# Patient Record
Sex: FEMALE | Race: WHITE | Employment: FULL TIME | URBAN - METROPOLITAN AREA
[De-identification: names, ages, dates, MRNs, and addresses within clinical notes are randomized per-mention and may not be internally consistent; named-entity substitution may affect disease eponyms.]

---

## 2018-07-27 ENCOUNTER — HOSPITAL ENCOUNTER (OUTPATIENT)
Age: 38
Discharge: HOME | End: 2018-07-29
Payer: COMMERCIAL

## 2018-07-27 ENCOUNTER — OFFICE VISIT (OUTPATIENT)
Dept: PRIMARY CARE CLINIC | Age: 38
End: 2018-07-27
Payer: COMMERCIAL

## 2018-07-27 VITALS
TEMPERATURE: 97.6 F | OXYGEN SATURATION: 98 % | HEART RATE: 76 BPM | HEIGHT: 62 IN | DIASTOLIC BLOOD PRESSURE: 84 MMHG | SYSTOLIC BLOOD PRESSURE: 122 MMHG | WEIGHT: 200 LBS | BODY MASS INDEX: 36.8 KG/M2

## 2018-07-27 DIAGNOSIS — R53.82 CHRONIC FATIGUE: Primary | ICD-10-CM

## 2018-07-27 DIAGNOSIS — S46.911A STRAIN OF RIGHT SHOULDER, INITIAL ENCOUNTER: ICD-10-CM

## 2018-07-27 DIAGNOSIS — R53.82 CHRONIC FATIGUE: ICD-10-CM

## 2018-07-27 LAB
ANION GAP SERPL CALCULATED.3IONS-SCNC: 12 MMOL/L (ref 7–16)
BUN BLDV-MCNC: 15 MG/DL (ref 6–20)
CALCIUM SERPL-MCNC: 9.1 MG/DL (ref 8.6–10.2)
CHLORIDE BLD-SCNC: 105 MMOL/L (ref 98–107)
CO2: 25 MMOL/L (ref 22–29)
CREAT SERPL-MCNC: 1 MG/DL (ref 0.5–1)
GFR AFRICAN AMERICAN: >60
GFR NON-AFRICAN AMERICAN: >60 ML/MIN/1.73
GLUCOSE BLD-MCNC: 83 MG/DL (ref 74–109)
HCT VFR BLD CALC: 42.7 % (ref 34–48)
HEMOGLOBIN: 13.8 G/DL (ref 11.5–15.5)
MCH RBC QN AUTO: 28.5 PG (ref 26–35)
MCHC RBC AUTO-ENTMCNC: 32.3 % (ref 32–34.5)
MCV RBC AUTO: 88 FL (ref 80–99.9)
PDW BLD-RTO: 14.1 FL (ref 11.5–15)
PLATELET # BLD: 236 E9/L (ref 130–450)
PMV BLD AUTO: 11.5 FL (ref 7–12)
POTASSIUM SERPL-SCNC: 4.3 MMOL/L (ref 3.5–5)
RBC # BLD: 4.85 E12/L (ref 3.5–5.5)
SODIUM BLD-SCNC: 142 MMOL/L (ref 132–146)
TSH SERPL DL<=0.05 MIU/L-ACNC: 1.79 UIU/ML (ref 0.27–4.2)
WBC # BLD: 7.7 E9/L (ref 4.5–11.5)

## 2018-07-27 PROCEDURE — 99214 OFFICE O/P EST MOD 30 MIN: CPT | Performed by: FAMILY MEDICINE

## 2018-07-27 PROCEDURE — 80048 BASIC METABOLIC PNL TOTAL CA: CPT

## 2018-07-27 PROCEDURE — 84443 ASSAY THYROID STIM HORMONE: CPT

## 2018-07-27 PROCEDURE — 85027 COMPLETE CBC AUTOMATED: CPT

## 2018-07-27 ASSESSMENT — ENCOUNTER SYMPTOMS
SORE THROAT: 0
CONSTIPATION: 0
SWOLLEN GLANDS: 0
VISUAL CHANGE: 0
DIARRHEA: 0
VOMITING: 0
COUGH: 0
SHORTNESS OF BREATH: 0
ABDOMINAL PAIN: 0
NAUSEA: 0

## 2018-07-27 NOTE — PATIENT INSTRUCTIONS
Patient Education        Rotator Cuff: Exercises  Your Care Instructions  Here are some examples of typical rehabilitation exercises for your condition. Start each exercise slowly. Ease off the exercise if you start to have pain. Your doctor or physical therapist will tell you when you can start these exercises and which ones will work best for you. How to do the exercises  Pendulum swing    If you have pain in your back, do not do this exercise. 1. Hold on to a table or the back of a chair with your good arm. Then bend forward a little and let your sore arm hang straight down. This exercise does not use the arm muscles. Rather, use your legs and your hips to create movement that makes your arm swing freely. 2. Use the movement from your hips and legs to guide the slightly swinging arm back and forth like a pendulum (or elephant trunk). Then guide it in circles that start small (about the size of a dinner plate). Make the circles a bit larger each day, as your pain allows. 3. Do this exercise for 5 minutes, 5 to 7 times each day. 4. As you have less pain, try bending over a little farther to do this exercise. This will increase the amount of movement at your shoulder. Posterior stretching exercise    1. Hold the elbow of your injured arm with your other hand. 2. Use your hand to pull your injured arm gently up and across your body. You will feel a gentle stretch across the back of your injured shoulder. 3. Hold for at least 15 to 30 seconds. Then slowly lower your arm. 4. Repeat 2 to 4 times. Up-the-back stretch    Your doctor or physical therapist may want you to wait to do this stretch until you have regained most of your range of motion and strength. You can do this stretch in different ways. Hold any of these stretches for at least 15 to 30 seconds. Repeat them 2 to 4 times. 1. Put your hand in your back pocket. Let it rest there to stretch your shoulder.   2. With your other hand, hold your injured arm (palm outward) behind your back by the wrist. Pull your arm up gently to stretch your shoulder. 3. Next, put a towel over your other shoulder. Put the hand of your injured arm behind your back. Now hold the back end of the towel. With the other hand, hold the front end of the towel in front of your body. Pull gently on the front end of the towel. This will bring your hand farther up your back to stretch your shoulder. Overhead stretch    1. Standing about an arm's length away, grasp onto a solid surface. You could use a countertop, a doorknob, or the back of a sturdy chair. 2. With your knees slightly bent, bend forward with your arms straight. Lower your upper body, and let your shoulders stretch. 3. As your shoulders are able to stretch farther, you may need to take a step or two backward. 4. Hold for at least 15 to 30 seconds. Then stand up and relax. If you had stepped back during your stretch, step forward so you can keep your hands on the solid surface. 5. Repeat 2 to 4 times. Shoulder flexion (lying down)    To make a wand for this exercise, use a piece of PVC pipe or a broom handle with the broom removed. Make the wand about a foot wider than your shoulders. 1. Lie on your back, holding a wand with both hands. Your palms should face down as you hold the wand. 2. Keeping your elbows straight, slowly raise your arms over your head. Raise them until you feel a stretch in your shoulders, upper back, and chest.  3. Hold for 15 to 30 seconds. 4. Repeat 2 to 4 times. Shoulder rotation (lying down)    To make a wand for this exercise, use a piece of PVC pipe or a broom handle with the broom removed. Make the wand about a foot wider than your shoulders. 1. Lie on your back. Hold a wand with both hands with your elbows bent and palms up. 2. Keep your elbows close to your body, and move the wand across your body toward the sore arm. 3. Hold for 8 to 12 seconds. 4. Repeat 2 to 4 times.   Wall climbing (to the side)    Avoid any movement that is straight to your side, and be careful not to arch your back. Your arm should stay about 30 degrees to the front of your side. 1. Stand with your side to a wall so that your fingers can just touch it at an angle about 30 degrees toward the front of your body. 2. Walk the fingers of your injured arm up the wall as high as pain permits. Try not to shrug your shoulder up toward your ear as you move your arm up. 3. Hold that position for a count of at least 15 to 20.  4. Walk your fingers back down to the starting position. 5. Repeat at least 2 to 4 times. Try to reach higher each time. Wall climbing (to the front)    During this stretching exercise, be careful not to arch your back. 1. Face a wall, and stand so your fingers can just touch it. 2. Keeping your shoulder down, walk the fingers of your injured arm up the wall as high as pain permits. (Don't shrug your shoulder up toward your ear.)  3. Hold your arm in that position for at least 15 to 30 seconds. 4. Slowly walk your fingers back down to where you started. 5. Repeat at least 2 to 4 times. Try to reach higher each time. Shoulder blade squeeze    1. Stand with your arms at your sides, and squeeze your shoulder blades together. Do not raise your shoulders up as you squeeze. 2. Hold 6 seconds. 3. Repeat 8 to 12 times. Scapular exercise: Arm reach    1. Lie flat on your back. This exercise is a very slight motion that starts with your arms raised (elbows straight, arms straight). 2. From this position, reach higher toward the lorena or ceiling. Keep your elbows straight. All motion should be from your shoulder blade only. 3. Relax your arms back to where you started. 4. Repeat 8 to 12 times. Arm raise to the side    During this strengthening exercise, your arm should stay about 30 degrees to the front of your side. 1. Slowly raise your injured arm to the side, with your thumb facing up.  Raise your arm 60

## 2018-07-27 NOTE — PROGRESS NOTES
Prabha Villarreal, a female of 45 y.o. came to the office 7/27/2018. Patient Active Problem List   Diagnosis    Status post induction of labor          Fatigue   This is a new problem. The current episode started more than 1 month ago (Greater than 6 months ago). The problem occurs constantly. The problem has been unchanged (A little improved in the summer months). Associated symptoms include fatigue and headaches. Pertinent negatives include no abdominal pain, chest pain, chills, coughing, fever, nausea, sore throat, swollen glands, visual change or vomiting. Arthralgias: A lot of bad headaches in Feb and March, were daily at the time, but have since resolved. Nothing aggravates the symptoms. She has tried nothing for the symptoms. R. Shoulder: Started working out in May, involved a lot of push-ups and planks and that's when the shoulder started hurting. Hurts more with overuse     GERD: Saw Dr. Iona Troy in January. He did a scope at the time and found severe GERD. Currently on Omeprazole and has a 6 month follow-up scheduled. Allergies   Allergen Reactions    Adhesive Tape Rash       Current Outpatient Prescriptions on File Prior to Visit   Medication Sig Dispense Refill    omeprazole (PRILOSEC) 20 MG delayed release capsule TAKE ONE CAPSULE BY MOUTH ONCE A DAY 30 capsule 1    cetirizine (ZYRTEC) 10 MG tablet Take 10 mg by mouth daily. No current facility-administered medications on file prior to visit. Review of Systems   Constitutional: Positive for fatigue. Negative for chills and fever. HENT: Negative for sore throat. Respiratory: Negative for cough and shortness of breath. Cardiovascular: Negative for chest pain, palpitations and leg swelling. Gastrointestinal: Negative for abdominal pain, constipation, diarrhea, nausea and vomiting. Endocrine: Positive for cold intolerance. Negative for heat intolerance, polyphagia and polyuria.    Genitourinary: Positive for menstrual problem (heavy). Musculoskeletal: Arthralgias: A lot of bad headaches in Feb and March, were daily at the time, but have since resolved. Skin:        + hair loss and brittle nails. Neurological: Positive for headaches. Psychiatric/Behavioral: Positive for sleep disturbance (Restless feeling in her legs at night when she tries to go to sleep, can be painful at times, been happening for 3-4 months). Negative for agitation, decreased concentration and dysphoric mood. The patient is not nervous/anxious. All other review of systems reviewed and are negative    OBJECTIVE:  /84   Pulse 76   Temp 97.6 °F (36.4 °C)   Ht 5' 2\" (1.575 m)   Wt 200 lb (90.7 kg)   SpO2 98%   BMI 36.58 kg/m²      Physical Exam   Constitutional: She is oriented to person, place, and time. She appears well-developed and well-nourished. HENT:   Head: Normocephalic and atraumatic. Right Ear: External ear normal.   Left Ear: External ear normal.   Nose: Nose normal.   Mouth/Throat: No oropharyngeal exudate. Eyes: Conjunctivae and EOM are normal. Pupils are equal, round, and reactive to light. Neck: Normal range of motion. Neck supple. No thyromegaly present. Cardiovascular: Normal rate, regular rhythm and normal heart sounds. Pulmonary/Chest: Effort normal and breath sounds normal. No respiratory distress. She has no wheezes. Abdominal: Soft. Bowel sounds are normal. There is tenderness (mild) in the right upper quadrant and epigastric area. Musculoskeletal:        Right shoulder: She exhibits no tenderness, no bony tenderness, no swelling and no effusion. R. Shoulder pain with abduction above 90 and extension of the shoulder   Lymphadenopathy:     She has no cervical adenopathy. Neurological: She is alert and oriented to person, place, and time. No cranial nerve deficit. Skin: Skin is warm and dry. Psychiatric: She has a normal mood and affect.    Right Shoulder Exam     Tenderness   None    Range of

## 2018-12-03 ENCOUNTER — OFFICE VISIT (OUTPATIENT)
Dept: PRIMARY CARE CLINIC | Age: 38
End: 2018-12-03
Payer: COMMERCIAL

## 2018-12-03 VITALS
BODY MASS INDEX: 39.87 KG/M2 | WEIGHT: 218 LBS | TEMPERATURE: 97.7 F | OXYGEN SATURATION: 98 % | HEART RATE: 82 BPM | DIASTOLIC BLOOD PRESSURE: 82 MMHG | SYSTOLIC BLOOD PRESSURE: 122 MMHG

## 2018-12-03 DIAGNOSIS — J06.9 URI WITH COUGH AND CONGESTION: Primary | ICD-10-CM

## 2018-12-03 PROCEDURE — 99213 OFFICE O/P EST LOW 20 MIN: CPT | Performed by: FAMILY MEDICINE

## 2018-12-03 RX ORDER — PROMETHAZINE HYDROCHLORIDE AND CODEINE PHOSPHATE 6.25; 1 MG/5ML; MG/5ML
5 SYRUP ORAL 4 TIMES DAILY PRN
Qty: 180 ML | Refills: 0 | Status: SHIPPED | OUTPATIENT
Start: 2018-12-03 | End: 2018-12-12

## 2018-12-03 RX ORDER — AMOXICILLIN 500 MG/1
500 CAPSULE ORAL 3 TIMES DAILY
Qty: 30 CAPSULE | Refills: 0 | Status: SHIPPED | OUTPATIENT
Start: 2018-12-03 | End: 2018-12-13

## 2018-12-03 ASSESSMENT — ENCOUNTER SYMPTOMS
WHEEZING: 0
RHINORRHEA: 0
SORE THROAT: 0
COUGH: 1
SHORTNESS OF BREATH: 1

## 2018-12-03 ASSESSMENT — PATIENT HEALTH QUESTIONNAIRE - PHQ9
SUM OF ALL RESPONSES TO PHQ QUESTIONS 1-9: 0
1. LITTLE INTEREST OR PLEASURE IN DOING THINGS: 0
SUM OF ALL RESPONSES TO PHQ9 QUESTIONS 1 & 2: 0
2. FEELING DOWN, DEPRESSED OR HOPELESS: 0
SUM OF ALL RESPONSES TO PHQ QUESTIONS 1-9: 0

## 2018-12-03 NOTE — PATIENT INSTRUCTIONS
idea of what usually happens with your specific illness. · Think about preparing papers that state your wishes. This way there will not be any confusion about what you want. You can change your instructions at any time. Which papers should you prepare? Advance directives are legal papers that tell doctors how you want to be cared for at the end of your life. You do not need a  to write these papers. Ask your doctor or your state health department for information on how to write your advance directives. They may have the forms for each of these types of papers. Make sure your doctor has a copy of these on file, and give a copy to a family member or close friend. · Consider a do-not-resuscitate order (DNR). This order asks that no extra treatments be done if your heart stops or you stop breathing. Extra treatments may include cardiopulmonary resuscitation (CPR), electrical shock to restart your heart, or a machine to breathe for you. If you decide to have a DNR order, ask your doctor to explain and write it. Place the order in your home where everyone can easily see it. · Consider a living will. A living will explains your wishes about life support and other treatments at the end of your life if you become unable to speak for yourself. Living crocker tell doctors to use or not use treatments that would keep you alive. You must have one or two witnesses or a notary present when you sign this form. · Consider a durable power of  for health care. This allows you to name a person to make decisions about your care if you are not able to. Most people ask a close friend or family member. Talk to this person about the kinds of treatments you want and those that you do not want. Make sure this person understands your wishes. These legal papers are simple to change. Tell your doctor what you want to change, and ask him or her to make a note in your medical file.  Give your family updated copies of the

## 2018-12-03 NOTE — PROGRESS NOTES
Ricky Guzman, a female of 45 y.o. came to the office 12/3/2018. Patient Active Problem List   Diagnosis    Status post induction of labor          Cough   This is a new problem. The current episode started in the past 7 days (11/28). The problem has been gradually worsening. The cough is non-productive. Associated symptoms include ear pain, headaches, postnasal drip and shortness of breath. Pertinent negatives include no chills, fever, nasal congestion, rhinorrhea, sore throat or wheezing. Associated symptoms comments: Had cold 4 wks ago. . Treatments tried: mucinex and Claritin daily. The treatment provided no relief. Allergies   Allergen Reactions    Adhesive Tape Rash       Current Outpatient Prescriptions on File Prior to Visit   Medication Sig Dispense Refill    omeprazole (PRILOSEC) 20 MG delayed release capsule TAKE ONE CAPSULE BY MOUTH ONCE A DAY 30 capsule 5    cetirizine (ZYRTEC) 10 MG tablet Take 10 mg by mouth daily. No current facility-administered medications on file prior to visit. Review of Systems   Constitutional: Negative for chills and fever. HENT: Positive for ear pain and postnasal drip. Negative for rhinorrhea and sore throat. Respiratory: Positive for cough and shortness of breath. Negative for wheezing. Neurological: Positive for headaches. All other review of systems reviewed and are negative    OBJECTIVE:  /82   Pulse 82   Temp 97.7 °F (36.5 °C)   Wt 218 lb (98.9 kg)   SpO2 98%   BMI 39.87 kg/m²      Physical Exam   Constitutional: No distress. HENT:   Right Ear: Tympanic membrane normal.   Left Ear: Tympanic membrane normal.   Nose: Mucosal edema (right) and rhinorrhea (mucus strand) present. Right sinus exhibits no maxillary sinus tenderness and no frontal sinus tenderness. Left sinus exhibits no maxillary sinus tenderness and no frontal sinus tenderness.    Mouth/Throat: Uvula is midline, oropharynx is clear and moist and mucous membranes are normal. No oropharyngeal exudate or posterior oropharyngeal erythema. Neck: Neck supple. Cardiovascular: Normal rate and regular rhythm. Pulmonary/Chest: Effort normal and breath sounds normal.   Lymphadenopathy:     She has no cervical adenopathy. ASSESSMENT AND PLAN:    Izaiah Varma was seen today for cough. Diagnoses and all orders for this visit:    URI with cough and congestion  -     amoxicillin (AMOXIL) 500 MG capsule; Take 1 capsule by mouth 3 times daily for 10 days  -     promethazine-codeine (PHENERGAN WITH CODEINE) 6.25-10 MG/5ML syrup; Take 5 mLs by mouth 4 times daily as needed for Cough for up to 9 days. .    - continue Claritin and Mucinex  - push fluids. Controlled Substances Monitoring:     RX Monitoring 12/3/2018   Attestation The Prescription Monitoring Report for this patient was reviewed today. Documentation No signs of potential drug abuse or diversion identified. Return if symptoms worsen or fail to improve.     Alejandro Biggs, DO

## 2019-01-04 ENCOUNTER — HOSPITAL ENCOUNTER (OUTPATIENT)
Age: 39
Discharge: HOME | End: 2019-01-06

## 2019-01-04 LAB
HCT VFR BLD CALC: 41.1 % (ref 34–48)
HEMOGLOBIN: 13.5 G/DL (ref 11.5–15.5)
MCH RBC QN AUTO: 28.1 PG (ref 26–35)
MCHC RBC AUTO-ENTMCNC: 32.8 % (ref 32–34.5)
MCV RBC AUTO: 85.4 FL (ref 80–99.9)
PDW BLD-RTO: 13.7 FL (ref 11.5–15)
PLATELET # BLD: 264 E9/L (ref 130–450)
PMV BLD AUTO: 10.7 FL (ref 7–12)
RBC # BLD: 4.81 E12/L (ref 3.5–5.5)
WBC # BLD: 7 E9/L (ref 4.5–11.5)

## 2019-01-04 PROCEDURE — 88304 TISSUE EXAM BY PATHOLOGIST: CPT

## 2019-01-04 PROCEDURE — 85027 COMPLETE CBC AUTOMATED: CPT

## 2019-12-26 ENCOUNTER — OFFICE VISIT (OUTPATIENT)
Dept: FAMILY MEDICINE CLINIC | Age: 39
End: 2019-12-26
Payer: COMMERCIAL

## 2019-12-26 VITALS
OXYGEN SATURATION: 97 % | BODY MASS INDEX: 42.62 KG/M2 | WEIGHT: 233 LBS | TEMPERATURE: 98.5 F | RESPIRATION RATE: 18 BRPM | HEART RATE: 103 BPM

## 2019-12-26 DIAGNOSIS — J01.00 ACUTE NON-RECURRENT MAXILLARY SINUSITIS: ICD-10-CM

## 2019-12-26 DIAGNOSIS — J02.9 SORE THROAT: Primary | ICD-10-CM

## 2019-12-26 DIAGNOSIS — J02.0 ACUTE STREPTOCOCCAL PHARYNGITIS: ICD-10-CM

## 2019-12-26 LAB — S PYO AG THROAT QL: NORMAL

## 2019-12-26 PROCEDURE — 87880 STREP A ASSAY W/OPTIC: CPT | Performed by: FAMILY MEDICINE

## 2019-12-26 PROCEDURE — 99213 OFFICE O/P EST LOW 20 MIN: CPT | Performed by: FAMILY MEDICINE

## 2019-12-26 RX ORDER — FLUTICASONE PROPIONATE 50 MCG
1 SPRAY, SUSPENSION (ML) NASAL DAILY
Qty: 1 BOTTLE | Refills: 1 | Status: SHIPPED
Start: 2019-12-26 | End: 2021-09-07

## 2019-12-26 RX ORDER — CEFUROXIME AXETIL 250 MG/1
250 TABLET ORAL 2 TIMES DAILY
Qty: 20 TABLET | Refills: 0 | Status: SHIPPED
Start: 2019-12-26 | End: 2021-09-07

## 2019-12-26 RX ORDER — CETIRIZINE HYDROCHLORIDE 10 MG/1
10 TABLET ORAL DAILY
Qty: 30 TABLET | Refills: 1 | Status: SHIPPED
Start: 2019-12-26 | End: 2021-09-07

## 2019-12-26 ASSESSMENT — ENCOUNTER SYMPTOMS
WHEEZING: 0
EYES NEGATIVE: 1
RHINORRHEA: 1
CHOKING: 0
APNEA: 0
SHORTNESS OF BREATH: 0
COUGH: 1
SINUS PAIN: 1
SINUS PRESSURE: 1
CHEST TIGHTNESS: 0

## 2020-04-06 RX ORDER — OMEPRAZOLE 20 MG/1
CAPSULE, DELAYED RELEASE ORAL
Qty: 30 CAPSULE | Refills: 0 | Status: SHIPPED
Start: 2020-04-06 | End: 2020-04-29

## 2020-04-29 RX ORDER — OMEPRAZOLE 20 MG/1
CAPSULE, DELAYED RELEASE ORAL
Qty: 30 CAPSULE | Refills: 0 | Status: SHIPPED
Start: 2020-04-29 | End: 2020-05-15 | Stop reason: SDUPTHER

## 2020-05-15 RX ORDER — OMEPRAZOLE 20 MG/1
CAPSULE, DELAYED RELEASE ORAL
Qty: 30 CAPSULE | Refills: 0 | Status: SHIPPED
Start: 2020-05-15 | End: 2020-07-07

## 2020-07-07 RX ORDER — OMEPRAZOLE 20 MG/1
CAPSULE, DELAYED RELEASE ORAL
Qty: 30 CAPSULE | Refills: 3 | Status: SHIPPED
Start: 2020-07-07 | End: 2020-08-31

## 2020-08-31 RX ORDER — OMEPRAZOLE 20 MG/1
CAPSULE, DELAYED RELEASE ORAL
Qty: 30 CAPSULE | Refills: 3 | Status: SHIPPED
Start: 2020-08-31 | End: 2020-10-14 | Stop reason: SDUPTHER

## 2020-10-06 ENCOUNTER — TELEPHONE (OUTPATIENT)
Dept: ADMINISTRATIVE | Age: 40
End: 2020-10-06

## 2020-10-06 ENCOUNTER — OFFICE VISIT (OUTPATIENT)
Dept: PRIMARY CARE CLINIC | Age: 40
End: 2020-10-06
Payer: COMMERCIAL

## 2020-10-06 ENCOUNTER — HOSPITAL ENCOUNTER (OUTPATIENT)
Age: 40
Discharge: HOME | End: 2020-10-08
Payer: COMMERCIAL

## 2020-10-06 VITALS
HEIGHT: 62 IN | WEIGHT: 195 LBS | DIASTOLIC BLOOD PRESSURE: 66 MMHG | TEMPERATURE: 99.1 F | SYSTOLIC BLOOD PRESSURE: 122 MMHG | OXYGEN SATURATION: 96 % | BODY MASS INDEX: 35.88 KG/M2 | HEART RATE: 93 BPM

## 2020-10-06 PROCEDURE — U0003 INFECTIOUS AGENT DETECTION BY NUCLEIC ACID (DNA OR RNA); SEVERE ACUTE RESPIRATORY SYNDROME CORONAVIRUS 2 (SARS-COV-2) (CORONAVIRUS DISEASE [COVID-19]), AMPLIFIED PROBE TECHNIQUE, MAKING USE OF HIGH THROUGHPUT TECHNOLOGIES AS DESCRIBED BY CMS-2020-01-R: HCPCS

## 2020-10-06 PROCEDURE — 99213 OFFICE O/P EST LOW 20 MIN: CPT | Performed by: PHYSICIAN ASSISTANT

## 2020-10-06 RX ORDER — LORATADINE 10 MG/1
10 TABLET ORAL DAILY
COMMUNITY
End: 2021-09-07

## 2020-10-06 RX ORDER — AZITHROMYCIN 250 MG/1
TABLET, FILM COATED ORAL
Qty: 6 TABLET | Refills: 0 | Status: SHIPPED
Start: 2020-10-06 | End: 2021-09-07

## 2020-10-06 NOTE — PROGRESS NOTES
Chief Complaint   Generalized Body Aches (x3 days ago ); Fever (high 100.5); Chills; Other (loss of smell and taste ); Headache; and Cough (nonproductive)      History of Present Illness   Source of history provided by: patient. Bethany Amaya is a 36 y.o. old female who has a past medical history of: History reviewed. No pertinent past medical history. Presents to the flu clinic for evaluation of body aches, chills, fever (high of 100.5), loss of taste/smell, headache, and nonproductive cough x 3 days. States symptoms have been persistent since onset. Denies any CP, dyspnea, LE edema, abdominal pain, vomiting, rash, or lethargy. Denies history of international travel in the past 14 days. Denies contact with individuals with known COVID-19 infection or under investigation for COVID-19 infection. Denies any hx of asthma or tobacco use. ROS   Pertinent positives and negatives are stated within HPI, all other systems reviewed and are negative. Surgical History:  has a past surgical history that includes Tonsillectomy (1986); [place] tooth extraction (2003); and Cholecystectomy, laparoscopic (01/04/2019). Social History:  reports that she has never smoked. She has never used smokeless tobacco. She reports current alcohol use. She reports that she does not use drugs. Family History: family history includes Arthritis in her mother; Cancer in her mother; Depression in her mother; Hearing Loss in her mother; Heart Disease in her father; High Blood Pressure in her father; High Cholesterol in her father; Other in her mother. Allergies: Adhesive tape    Physical Exam      VS:  /66   Pulse 93   Temp 99.1 °F (37.3 °C) (Oral)   Ht 5' 2\" (1.575 m)   Wt 195 lb (88.5 kg)   SpO2 96%   BMI 35.67 kg/m²    Oxygen Saturation Interpretation: Normal.    Constitutional:  Alert, development consistent with age. NAD. Head:  NC/NT. Airway patent. Ears: TMs translucent bilaterally.  Canals without exudate or vomiting, abdominal pain, lethargy, flank pain, or decreased urinary output. Pt verbalizes understanding and is in agreement with plan of care. All questions answered. Meryle Spaniel Spillan, PA-C    This visit was provided as a focused evaluation during the COVID -19 pandemic/national emergency. A comprehensive review of all previous patient history and testing was not conducted. Pertinent findings were elicited during the visit.

## 2020-10-07 LAB
SARS-COV-2: DETECTED
SOURCE: ABNORMAL

## 2020-10-14 ENCOUNTER — VIRTUAL VISIT (OUTPATIENT)
Dept: PRIMARY CARE CLINIC | Age: 40
End: 2020-10-14
Payer: COMMERCIAL

## 2020-10-14 PROBLEM — K21.9 GASTROESOPHAGEAL REFLUX DISEASE WITHOUT ESOPHAGITIS: Status: ACTIVE | Noted: 2020-10-14

## 2020-10-14 PROCEDURE — 99213 OFFICE O/P EST LOW 20 MIN: CPT | Performed by: FAMILY MEDICINE

## 2020-10-14 RX ORDER — BROMPHENIRAMINE MALEATE, PSEUDOEPHEDRINE HYDROCHLORIDE, AND DEXTROMETHORPHAN HYDROBROMIDE 2; 30; 10 MG/5ML; MG/5ML; MG/5ML
5 SYRUP ORAL 4 TIMES DAILY PRN
Qty: 200 ML | Refills: 0 | Status: SHIPPED
Start: 2020-10-14 | End: 2021-09-07

## 2020-10-14 RX ORDER — OMEPRAZOLE 20 MG/1
CAPSULE, DELAYED RELEASE ORAL
Qty: 30 CAPSULE | Refills: 3 | Status: SHIPPED
Start: 2020-10-14 | End: 2021-05-14

## 2020-10-14 ASSESSMENT — ENCOUNTER SYMPTOMS
COUGH: 1
SHORTNESS OF BREATH: 0

## 2020-10-14 NOTE — PROGRESS NOTES
Juanis Navarro, a female of 36 y.o.did a virtual visit on 10/14/2020. Patient Active Problem List   Diagnosis    Gastroesophageal reflux disease without esophagitis          HPI covid 23: started to feel sick 10/3 then headache 10/4 with sinus symptoms and allergy ones as well. 10/5 began with fever 100.5 and chills. 10/6 body aches, lost of smell. Tested at [facility] and told day later she was +. Low grade temp of 99.5 in evenings. Was put on Z qiana and finished it 5 days ago. GERD: needs ppi daily or gets heartburn    Allergies   Allergen Reactions    Adhesive Tape Rash       Current Outpatient Medications on File Prior to Visit   Medication Sig Dispense Refill    loratadine (CLARITIN) 10 MG tablet Take 10 mg by mouth daily      azithromycin (ZITHROMAX Z-QIANA) 250 MG tablet Take 2 tabs on day one, then 1 tab daily for the next 4 days (Patient not taking: Reported on 10/14/2020) 6 tablet 0    cetirizine (ZYRTEC) 10 MG tablet Take 1 tablet by mouth daily (Patient not taking: Reported on 10/6/2020) 30 tablet 1    fluticasone (FLONASE ALLERGY RELIEF) 50 MCG/ACT nasal spray 1 spray by Each Nostril route daily (Patient not taking: Reported on 10/6/2020) 1 Bottle 1    cefUROXime (CEFTIN) 250 MG tablet Take 1 tablet by mouth 2 times daily (Patient not taking: Reported on 10/6/2020) 20 tablet 0    cetirizine (ZYRTEC) 10 MG tablet Take 10 mg by mouth daily. No current facility-administered medications on file prior to visit. Review of Systems   Constitutional: Negative for chills and fever. HENT: Positive for postnasal drip. Respiratory: Positive for cough (productive at times with clear mucus. ). Negative for shortness of breath. other review of systems reviewed and are negative    OBJECTIVE:  There were no vitals taken for this visit. Physical Exam  Constitutional:       General: She is not in acute distress. Appearance: Normal appearance.  She is not ill-appearing, toxic-appearing or diaphoretic. HENT:      Head: Normocephalic. Eyes:      General: No scleral icterus. Pulmonary:      Effort: Pulmonary effort is normal.   Neurological:      Mental Status: She is alert and oriented to person, place, and time. Psychiatric:         Mood and Affect: Mood normal.         ASSESSMENT AND PLAN:    Racheal Hinojosa was seen today for other. Diagnoses and all orders for this visit:    COVID-19 virus infection  -     brompheniramine-pseudoephedrine-DM (BROMFED DM) 2-30-10 MG/5ML syrup; Take 5 mLs by mouth 4 times daily as needed for Cough    Gastroesophageal reflux disease without esophagitis  -     omeprazole (PRILOSEC) 20 MG delayed release capsule; One daily    - tylenol prn   - continue ppi    Return if symptoms worsen or fail to improve. Marianne Berg, DO    TeleMedicine Patient Consent    This visit was performed as a virtual video visit using a synchronous, two-way, audio-video telehealth technology platform. Patient identification was verified at the start of the visit, including the patient's telephone number and physical location. I discussed with the patient the nature of our telehealth visits, that:     1. Due to the nature of an audio- video modality, the only components of a physical exam that could be done are the elements supported by direct observation. 2. I would evaluate the patient and recommend diagnostics and treatments based on my assessment. 3. If it was felt that the patient should be evaluated in clinic or an emergency room setting, then they would be directed there. 4. Our sessions are not being recorded and that personal health information is protected. 5. Our team would provide follow up care in person if/when the patient needs it. Patient does agree to proceed with telemedicine consultation. Patient's location: home address in [facility]. Physician  location [facility]. other people involved in call none.     Time spent: Not billed by time    This visit was completed virtually using Doxy. me

## 2021-05-14 DIAGNOSIS — K21.9 GASTROESOPHAGEAL REFLUX DISEASE WITHOUT ESOPHAGITIS: ICD-10-CM

## 2021-05-14 RX ORDER — OMEPRAZOLE 20 MG/1
CAPSULE, DELAYED RELEASE ORAL
Qty: 30 CAPSULE | Refills: 3 | Status: SHIPPED
Start: 2021-05-14 | End: 2021-09-10

## 2021-09-04 ENCOUNTER — APPOINTMENT (OUTPATIENT)
Dept: GENERAL RADIOLOGY | Age: 41
End: 2021-09-04
Payer: COMMERCIAL

## 2021-09-04 ENCOUNTER — HOSPITAL ENCOUNTER (EMERGENCY)
Age: 41
Discharge: HOME | End: 2021-09-04
Payer: COMMERCIAL

## 2021-09-04 VITALS
DIASTOLIC BLOOD PRESSURE: 70 MMHG | OXYGEN SATURATION: 98 % | BODY MASS INDEX: 33.86 KG/M2 | HEIGHT: 62 IN | TEMPERATURE: 97.3 F | RESPIRATION RATE: 16 BRPM | HEART RATE: 87 BPM | WEIGHT: 184 LBS | SYSTOLIC BLOOD PRESSURE: 120 MMHG

## 2021-09-04 DIAGNOSIS — S61.209A AVULSION OF SKIN OF FINGER, INITIAL ENCOUNTER: ICD-10-CM

## 2021-09-04 DIAGNOSIS — S69.91XA INJURY OF FINGER OF RIGHT HAND, INITIAL ENCOUNTER: Primary | ICD-10-CM

## 2021-09-04 PROCEDURE — 6370000000 HC RX 637 (ALT 250 FOR IP)

## 2021-09-04 PROCEDURE — 99284 EMERGENCY DEPT VISIT MOD MDM: CPT

## 2021-09-04 PROCEDURE — 6360000002 HC RX W HCPCS: Performed by: EMERGENCY MEDICINE

## 2021-09-04 PROCEDURE — 6370000000 HC RX 637 (ALT 250 FOR IP): Performed by: NURSE PRACTITIONER

## 2021-09-04 PROCEDURE — 73140 X-RAY EXAM OF FINGER(S): CPT

## 2021-09-04 PROCEDURE — 90471 IMMUNIZATION ADMIN: CPT | Performed by: EMERGENCY MEDICINE

## 2021-09-04 PROCEDURE — 90715 TDAP VACCINE 7 YRS/> IM: CPT | Performed by: EMERGENCY MEDICINE

## 2021-09-04 RX ORDER — IBUPROFEN 600 MG/1
600 TABLET ORAL 3 TIMES DAILY PRN
Qty: 30 TABLET | Refills: 0 | Status: SHIPPED | OUTPATIENT
Start: 2021-09-04 | End: 2021-09-07

## 2021-09-04 RX ORDER — DIAPER,BRIEF,INFANT-TODD,DISP
EACH MISCELLANEOUS
Status: COMPLETED
Start: 2021-09-04 | End: 2021-09-04

## 2021-09-04 RX ORDER — DIAPER,BRIEF,INFANT-TODD,DISP
EACH MISCELLANEOUS ONCE
Status: COMPLETED | OUTPATIENT
Start: 2021-09-04 | End: 2021-09-04

## 2021-09-04 RX ORDER — IBUPROFEN 800 MG/1
800 TABLET ORAL ONCE
Status: COMPLETED | OUTPATIENT
Start: 2021-09-04 | End: 2021-09-04

## 2021-09-04 RX ORDER — BACITRACIN ZINC AND POLYMYXIN B SULFATE 500; 1000 [USP'U]/G; [USP'U]/G
OINTMENT TOPICAL
Qty: 30 G | Refills: 0 | Status: SHIPPED | OUTPATIENT
Start: 2021-09-04 | End: 2021-09-07

## 2021-09-04 RX ADMIN — Medication 1 G: at 11:57

## 2021-09-04 RX ADMIN — TETANUS TOXOID, REDUCED DIPHTHERIA TOXOID AND ACELLULAR PERTUSSIS VACCINE, ADSORBED 0.5 ML: 5; 2.5; 8; 8; 2.5 SUSPENSION INTRAMUSCULAR at 10:49

## 2021-09-04 RX ADMIN — BACITRACIN ZINC 1 G: 500 OINTMENT TOPICAL at 11:57

## 2021-09-04 RX ADMIN — IBUPROFEN 800 MG: 800 TABLET, FILM COATED ORAL at 11:56

## 2021-09-04 ASSESSMENT — PAIN DESCRIPTION - ORIENTATION: ORIENTATION: RIGHT

## 2021-09-04 ASSESSMENT — PAIN DESCRIPTION - LOCATION: LOCATION: FINGER (COMMENT WHICH ONE)

## 2021-09-04 ASSESSMENT — PAIN SCALES - GENERAL
PAINLEVEL_OUTOF10: 8
PAINLEVEL_OUTOF10: 6
PAINLEVEL_OUTOF10: 7

## 2021-09-04 NOTE — ED NOTES
Pt soaking right ring finger in a solution of 50/50 saline and peroxide      Ty GARDENIA Rooney  09/04/21 [id number]

## 2021-09-04 NOTE — ED NOTES
Right 4th finger swollen and ecchymotic. 1.5 cm flap laceration present. Wound closed with steri-strips.   Nonadherent dressing applied and secured with Milo Iniguez RN  09/04/21 [id number]

## 2021-09-07 ENCOUNTER — OFFICE VISIT (OUTPATIENT)
Dept: PRIMARY CARE CLINIC | Age: 41
End: 2021-09-07
Payer: COMMERCIAL

## 2021-09-07 VITALS
DIASTOLIC BLOOD PRESSURE: 80 MMHG | HEIGHT: 62 IN | RESPIRATION RATE: 14 BRPM | OXYGEN SATURATION: 97 % | TEMPERATURE: 97.7 F | HEART RATE: 80 BPM | BODY MASS INDEX: 35.15 KG/M2 | WEIGHT: 191 LBS | SYSTOLIC BLOOD PRESSURE: 110 MMHG

## 2021-09-07 DIAGNOSIS — S61.214D LACERATION OF RIGHT RING FINGER WITHOUT FOREIGN BODY WITHOUT DAMAGE TO NAIL, SUBSEQUENT ENCOUNTER: Primary | ICD-10-CM

## 2021-09-07 PROCEDURE — 99212 OFFICE O/P EST SF 10 MIN: CPT | Performed by: FAMILY MEDICINE

## 2021-09-07 RX ORDER — LORATADINE 10 MG/1
10 TABLET ORAL DAILY
COMMUNITY

## 2021-09-07 SDOH — ECONOMIC STABILITY: FOOD INSECURITY: WITHIN THE PAST 12 MONTHS, YOU WORRIED THAT YOUR FOOD WOULD RUN OUT BEFORE YOU GOT MONEY TO BUY MORE.: NEVER TRUE

## 2021-09-07 SDOH — ECONOMIC STABILITY: FOOD INSECURITY: WITHIN THE PAST 12 MONTHS, THE FOOD YOU BOUGHT JUST DIDN'T LAST AND YOU DIDN'T HAVE MONEY TO GET MORE.: NEVER TRUE

## 2021-09-07 ASSESSMENT — SOCIAL DETERMINANTS OF HEALTH (SDOH): HOW HARD IS IT FOR YOU TO PAY FOR THE VERY BASICS LIKE FOOD, HOUSING, MEDICAL CARE, AND HEATING?: NOT HARD AT ALL

## 2021-09-07 ASSESSMENT — PATIENT HEALTH QUESTIONNAIRE - PHQ9
SUM OF ALL RESPONSES TO PHQ QUESTIONS 1-9: 0
2. FEELING DOWN, DEPRESSED OR HOPELESS: 0
1. LITTLE INTEREST OR PLEASURE IN DOING THINGS: 0
SUM OF ALL RESPONSES TO PHQ9 QUESTIONS 1 & 2: 0

## 2021-09-07 NOTE — ED PROVIDER NOTES
[facility]  Department of Emergency Medicine   ED  Encounter Note  Admit Date/RoomTime: 2021 10:21 AM  ED Room:     NAME: Vic Diaz  : 1980  MRN: [id number]     Chief Complaint:  Finger Injury (caught right ring finger inbetween to dumb bells while working out. finger splip open, bleeding is active at this time.)    History of Present Illness        Vic Diaz is a 39 y.o. old female presenting to the emergency department by private vehicle, for traumatic Right Ring Finger pain which occured several hour(s) prior to arrival.  The complaint is due to pinched her finger inbetween 2 dumbbells while working out. Since onset the symptoms have been waxing and waning. Patient has no prior history of pain/injury with regards to today's visit. Her pain is aggraveated by pressure on or palpation of painful area and relieved by nothing, as no treatment has been provided prior to this visit. She is right handed. Tetanus Status: unknown. ROS   Pertinent positives and negatives are stated within HPI, all other systems reviewed and are negative. Past Medical History:  has no past medical history on file. Surgical History:  has a past surgical history that includes Tonsillectomy (); [place] tooth extraction (); and Cholecystectomy, laparoscopic (2019). Social History:  reports that she has never smoked. She has never used smokeless tobacco. She reports current alcohol use. She reports that she does not use drugs. Family History: family history includes Arthritis in her mother; Cancer in her mother; Depression in her mother; Hearing Loss in her mother; Heart Disease in her father; High Blood Pressure in her father; High Cholesterol in her father; Other in her mother.      Allergies: Adhesive tape    Physical Exam   Oxygen Saturation Interpretation: Normal.        ED Triage Vitals   BP Temp Temp Source Pulse Resp SpO2 Height Weight ointment (1 g Topical Given 9/4/21 1157)        Consult(s):   None    Procedure(s):  None    MDM:      Imaging was obtained based on moderate suspicion for fracture / bony abnormality as per history/physical findings. Plan is subsequently for symptom control, limited use and  immobilization with appropriate outpatient follow-up. Patient's wound was cleansed with sterile water povidone iodine and Steri-Strips were applied patient was placed in a finger splint. Was placed on topical bacitracin ointment she was educated on anti-inflammatory medication and ice. Patient is agreeable to plan of care all questions were answered. Patient stable for outpatient follow-up. Plan of Care/Counseling:  NICCI Aden CNP reviewed today's visit with the patient in addition to providing specific details for the plan of care and counseling regarding the diagnosis and prognosis. Questions are answered at this time and are agreeable with the plan. Assessment      1. Injury of finger of right hand, initial encounter    2. Avulsion of skin of finger, initial encounter      Plan   Discharged home. Patient condition is good    New Medications     Discharge Medication List as of 9/4/2021 11:42 AM      START taking these medications    Details   bacitracin-polymyxin b (POLYSPORIN) [id number] UNIT/GM ointment APPLY TO AFFECTED AREA BID, Disp-30 g, R-0, Normal      ibuprofen (ADVIL;MOTRIN) 600 MG tablet Take 1 tablet by mouth 3 times daily as needed for Pain, Disp-30 tablet, R-0Normal           Electronically signed by NICCI Aden CNP   DD: 9/7/21  **This report was transcribed using voice recognition software. Every effort was made to ensure accuracy; however, inadvertent computerized transcription errors may be present.   END OF ED PROVIDER NOTE         NICCI Kendall CNP  09/07/21 1457

## 2021-09-07 NOTE — PROGRESS NOTES
Opal Salazar, a female of 39 y.o. came to the office 9/7/2021. Patient Active Problem List   Diagnosis    Gastroesophageal reflux disease without esophagitis          HPI finger injury: dropped 40 lb dumb bell on right ring finger on 9/4. In ER that day. X ray negative for fracture. Wound steri striped. Allergies   Allergen Reactions    Adhesive Tape Rash       Current Outpatient Medications on File Prior to Visit   Medication Sig Dispense Refill    loratadine (CLARITIN) 10 MG tablet Take 10 mg by mouth daily      omeprazole (PRILOSEC) 20 MG delayed release capsule TAKE 1 CAPSULE BY MOUTH EVERY DAY 30 capsule 3     No current facility-administered medications on file prior to visit. Review of Systems   Musculoskeletal: Positive for joint swelling. other review of systems reviewed and are negative    OBJECTIVE:  /80   Pulse 80   Temp 97.7 °F (36.5 °C)   Resp 14   Ht 5' 2\" (1.575 m)   Wt 191 lb (86.6 kg)   LMP 08/08/2021   SpO2 97%   BMI 34.93 kg/m²      Physical Exam  Constitutional:       General: She is not in acute distress. Appearance: Normal appearance. She is not ill-appearing, toxic-appearing or diaphoretic. HENT:      Head: Normocephalic. Eyes:      General: No scleral icterus. Pulmonary:      Effort: Pulmonary effort is normal.   Musculoskeletal:      Right hand: Deformity and laceration present. Comments: Right ring finger: Steri-Stripped laceration palmar surface laterally between distal interphalangeal joint and proximal interphalangeal joint with swelling. There is ecchymosis to the distal phalanx. There is swelling of the finger noted as well. Decreased range of motion with flexion of the finger. Slight tenderness to palpation over the wound area. Neurological:      Mental Status: She is alert and oriented to person, place, and time.    Psychiatric:         Mood and Affect: Mood normal.         ASSESSMENT AND PLAN:    Wilbur Felty was seen today for finger injury. Diagnoses and all orders for this visit:    Laceration of right ring finger without foreign body without damage to nail, subsequent encounter    -Clean wound with saline solution. Applied bandage with triple antibiotic ointment on. Gauze wrapping placed. Return if symptoms worsen or fail to improve.     Terrie Biggs, DO

## 2021-09-10 DIAGNOSIS — K21.9 GASTROESOPHAGEAL REFLUX DISEASE WITHOUT ESOPHAGITIS: ICD-10-CM

## 2021-09-10 RX ORDER — OMEPRAZOLE 20 MG/1
CAPSULE, DELAYED RELEASE ORAL
Qty: 30 CAPSULE | Refills: 3 | Status: SHIPPED
Start: 2021-09-10 | End: 2022-01-18

## 2021-09-11 ENCOUNTER — HOSPITAL ENCOUNTER (EMERGENCY)
Age: 41
Discharge: HOME | End: 2021-09-11
Payer: COMMERCIAL

## 2021-09-11 VITALS
RESPIRATION RATE: 14 BRPM | HEART RATE: 70 BPM | SYSTOLIC BLOOD PRESSURE: 120 MMHG | DIASTOLIC BLOOD PRESSURE: 80 MMHG | WEIGHT: 184 LBS | HEIGHT: 62 IN | OXYGEN SATURATION: 97 % | BODY MASS INDEX: 33.86 KG/M2 | TEMPERATURE: 98 F

## 2021-09-11 DIAGNOSIS — Z51.89 VISIT FOR WOUND CHECK: Primary | ICD-10-CM

## 2021-09-11 PROCEDURE — 99283 EMERGENCY DEPT VISIT LOW MDM: CPT

## 2021-09-11 RX ORDER — CLINDAMYCIN PHOSPHATE 10 MG/G
GEL TOPICAL 2 TIMES DAILY
Qty: 60 G | Refills: 0 | Status: SHIPPED | OUTPATIENT
Start: 2021-09-11 | End: 2021-09-18

## 2021-09-11 ASSESSMENT — PAIN DESCRIPTION - LOCATION: LOCATION: FINGER (COMMENT WHICH ONE)

## 2021-09-11 ASSESSMENT — PAIN SCALES - GENERAL: PAINLEVEL_OUTOF10: 5

## 2021-09-11 ASSESSMENT — PAIN DESCRIPTION - ORIENTATION: ORIENTATION: RIGHT

## 2021-09-11 ASSESSMENT — PAIN DESCRIPTION - PAIN TYPE: TYPE: ACUTE PAIN

## 2021-09-11 NOTE — ED PROVIDER NOTES
[facility]    HPI:  9/11/21, Time: 10:02 AM EDT         Joao Manley is a 39 y.o. female presenting to the ED for right ring finger wound check, beginning 1 week ago after she accidentally smashed her finger with a 40 pound dumbell. The complaint has been persistent, mild in severity, and worsened by nothing. Patient states that she had Steri-Strips placed on the wound just after the injury. She was also prescribed polymyxin ointment that she has been using L prevent infection. She is back today for a wound check. She states that it does still appear bruised and there is a small white patch on the area. It is still painful from the injury. Denies any fevers at home. No recent travel or sick contacts. Patient denies all other symptoms at this time. Review of Systems:   A complete review of systems was performed and pertinent positives and negatives are stated within HPI, all other systems reviewed and are negative.          --------------------------------------------- PAST HISTORY ---------------------------------------------  Past Medical History:  has no past medical history on file. Past Surgical History:  has a past surgical history that includes Tonsillectomy (1986); [place] tooth extraction (2003); and Cholecystectomy, laparoscopic (01/04/2019). Social History:  reports that she has never smoked. She has never used smokeless tobacco. She reports current alcohol use. She reports that she does not use drugs. Family History: family history includes Arthritis in her mother; Cancer in her mother; Depression in her mother; Hearing Loss in her mother; Heart Disease in her father; High Blood Pressure in her father; High Cholesterol in her father; Other in her mother. The patients home medications have been reviewed.     Allergies: Adhesive tape    -------------------------------------------------- RESULTS -------------------------------------------------  All laboratory and radiology results have been personally reviewed by myself   LABS:  No results found for this visit on 09/11/21. RADIOLOGY:  Interpreted by Radiologist.  No orders to display       ------------------------- NURSING NOTES AND VITALS REVIEWED ---------------------------   The nursing notes within the ED encounter and vital signs as below have been reviewed. /80   Pulse 70   Temp 98 °F (36.7 °C) (Temporal)   Resp 14   Ht 5' 2\" (1.575 m)   Wt 184 lb (83.5 kg)   LMP 09/11/2021 (Exact Date)   SpO2 97%   BMI 33.65 kg/m²   Oxygen Saturation Interpretation: Normal      ---------------------------------------------------PHYSICAL EXAM--------------------------------------      Constitutional/General: Alert and oriented x3, well appearing, non toxic in NAD  Head: Normocephalic and atraumatic  Eyes: PERRL, EOMI  Mouth: Oropharynx clear, handling secretions, no trismus  Neck: Supple, full ROM,   Pulmonary: Lungs clear to auscultation bilaterally, no wheezes, rales, or rhonchi. Not in respiratory distress  Cardiovascular:  Regular rate and rhythm, no murmurs, gallops, or rubs. 2+ distal pulses  Abdomen: Soft, non tender, non distended,   Extremities: Moves all extremities x 4. Warm and well perfused  Skin: warm and dry without rash. Well-healing superficial laceration to the right ring finger. This does appear to be slightly wet still as she has had it covered most of the time. Small patch of white healing skin to the area. No surrounding erythema or drainage. Neurologic: GCS 15,  Psych: Normal Affect      ------------------------------ ED COURSE/MEDICAL DECISION MAKING----------------------  Medications - No data to display      ED COURSE:       Medical Decision Making:    Patient is a 70-year-old female presenting to the emergency department for wound check of her right ring finger. No evidence of acute infection other than one very small white area. We will switch to clindamycin cream to help with this.   Advised follow-up with PCP for recheck return emergency department with any new or worsening symptoms. Wound was rebandaged. Encouraged her to keep this open to air so can dry out and heal faster. Patient voiced understanding is agreeable to the above treatment plan. Counseling: The emergency provider has spoken with the patient and discussed todays results, in addition to providing specific details for the plan of care and counseling regarding the diagnosis and prognosis. Questions are answered at this time and they are agreeable with the plan.      --------------------------------- IMPRESSION AND DISPOSITION ---------------------------------    IMPRESSION  1. Visit for wound check        DISPOSITION  Disposition: Discharge to home  Patient condition is stable      NOTE: This report was transcribed using voice recognition software.  Every effort was made to ensure accuracy; however, inadvertent computerized transcription errors may be present        Leila Stearns, [id number]  09/11/21 1025

## 2021-11-15 ENCOUNTER — APPOINTMENT (OUTPATIENT)
Dept: GENERAL RADIOLOGY | Age: 41
End: 2021-11-15
Payer: COMMERCIAL

## 2021-11-15 ENCOUNTER — HOSPITAL ENCOUNTER (EMERGENCY)
Age: 41
Discharge: HOME | End: 2021-11-15
Payer: COMMERCIAL

## 2021-11-15 VITALS
RESPIRATION RATE: 14 BRPM | HEART RATE: 77 BPM | DIASTOLIC BLOOD PRESSURE: 80 MMHG | OXYGEN SATURATION: 99 % | HEIGHT: 62 IN | SYSTOLIC BLOOD PRESSURE: 136 MMHG | WEIGHT: 184 LBS | BODY MASS INDEX: 33.86 KG/M2 | TEMPERATURE: 97.6 F

## 2021-11-15 DIAGNOSIS — S93.401A SPRAIN OF RIGHT ANKLE, UNSPECIFIED LIGAMENT, INITIAL ENCOUNTER: Primary | ICD-10-CM

## 2021-11-15 PROCEDURE — 99284 EMERGENCY DEPT VISIT MOD MDM: CPT

## 2021-11-15 PROCEDURE — 73610 X-RAY EXAM OF ANKLE: CPT

## 2021-11-15 RX ORDER — NAPROXEN 500 MG/1
500 TABLET ORAL 2 TIMES DAILY
Qty: 14 TABLET | Refills: 0 | Status: SHIPPED | OUTPATIENT
Start: 2021-11-15 | End: 2021-11-22 | Stop reason: ALTCHOICE

## 2021-11-15 ASSESSMENT — PAIN DESCRIPTION - ORIENTATION: ORIENTATION: RIGHT

## 2021-11-15 ASSESSMENT — PAIN DESCRIPTION - PAIN TYPE: TYPE: ACUTE PAIN

## 2021-11-15 ASSESSMENT — PAIN SCALES - GENERAL: PAINLEVEL_OUTOF10: 6

## 2021-11-15 ASSESSMENT — PAIN DESCRIPTION - LOCATION: LOCATION: ANKLE

## 2021-11-15 NOTE — Clinical Note
Shanita Archer was seen and treated in our emergency department on 11/15/2021. She may return to work on 11/17/2021. If you have any questions or concerns, please don't hesitate to call.       Kandy Miller PA-C

## 2021-11-16 ENCOUNTER — NURSE TRIAGE (OUTPATIENT)
Dept: OTHER | Facility: CLINIC | Age: 41
End: 2021-11-16

## 2021-11-16 NOTE — ED PROVIDER NOTES
lb (83.5 kg)         Constitutional:  Alert, development consistent with age. Neck:  Normal ROM. Supple. Right Ankle: Lateral malleolus              Tenderness:  mild. Swelling: Mild. Deformity: no deformity observed/palpated. ROM: full range of motion with pain. Skin:  no wounds, erythema, or swelling. Neurovascular: Motor deficit: none. Sensory deficit:   none. Pulse deficit: none. Capillary refill: normal.  Right Knee:              Tenderness:  none. Swelling: None. Deformity: no deformity observed/palpated. ROM: full range of motion. Skin:  no wounds, erythema, or swelling. Right Foot:              Tenderness:  none. Swelling: None. Deformity: no deformity observed/palpated. ROM: full range of motion. Skin:  no wounds, erythema, or swelling  Gait:  normal.   Lymphatics: No lymphangitis or adenopathy noted. Neurological:  Oriented. Motor functions intact. Lab / Imaging Results   (All laboratory and radiology results have been personally reviewed by myself)  Labs:  No results found for this visit on 11/15/21. Imaging: All Radiology results interpreted by Radiologist unless otherwise noted. XR ANKLE RIGHT (MIN 3 VIEWS)   Final Result   No acute bony abnormalities. ED Course / Medical Decision Making   Medications - No data to display     Consults:   None    Procedure(s):  None    MDM:      Imaging was obtained based on low suspicion for fracture / bony abnormality, dislocation as per history/physical findings. Plan is subsequently for symptom control, limited use and  immobilization with appropriate outpatient follow-up.     Plan of Care/Counseling:  Remington Bosch PA-C reviewed today's visit with the patient in addition to providing specific details for the plan of care and counseling regarding the diagnosis and prognosis. Questions are answered at this time and are agreeable with the plan. Assessment      1. Sprain of right ankle, unspecified ligament, initial encounter      Plan   Discharged home. Patient condition is good    New Medications     Discharge Medication List as of 11/15/2021  9:31 PM      START taking these medications    Details   naproxen (NAPROSYN) 500 MG tablet Take 1 tablet by mouth 2 times daily for 7 days, Disp-14 tablet, R-0Normal           Electronically signed by Don Jaime PA-C   DD: 11/16/21  **This report was transcribed using voice recognition software. Every effort was made to ensure accuracy; however, inadvertent computerized transcription errors may be present.   END OF ED PROVIDER NOTE       Don Jaime PA-C  11/16/21 [id number]

## 2021-11-16 NOTE — ED NOTES
Discharge and follow up reviewed, no concerns. Pt assisted in wheelchair to mother in ed lobby.      Malik Ennis RN  11/15/21 [id number]

## 2021-11-16 NOTE — TELEPHONE ENCOUNTER
Received call from [facility] at [facility] with Red Flag Complaint. Brief description of triage: Patient called with right ankle injury - already seen by ER physician, duplicate call. Writer provided warm transfer to [facility] for appointment scheduling as recommended by ER physician. Attention Provider: Thank you for allowing me to participate in the care of your patient. The patient was connected to triage in response to information provided to the ECC/PSC. Please do not respond through this encounter as the response is not directed to a shared pool.     Reason for Disposition   Caller has already spoken with the PCP (or office), and has no further questions    Protocols used: NO CONTACT OR DUPLICATE CONTACT CALL-ADULT-[place]

## 2021-11-22 ENCOUNTER — OFFICE VISIT (OUTPATIENT)
Dept: PRIMARY CARE CLINIC | Age: 41
End: 2021-11-22
Payer: COMMERCIAL

## 2021-11-22 VITALS
OXYGEN SATURATION: 98 % | SYSTOLIC BLOOD PRESSURE: 134 MMHG | DIASTOLIC BLOOD PRESSURE: 82 MMHG | BODY MASS INDEX: 34.57 KG/M2 | TEMPERATURE: 98 F | WEIGHT: 189 LBS | HEART RATE: 72 BPM

## 2021-11-22 DIAGNOSIS — S93.431D SPRAIN OF TIBIOFIBULAR LIGAMENT OF RIGHT ANKLE, SUBSEQUENT ENCOUNTER: Primary | ICD-10-CM

## 2021-11-22 DIAGNOSIS — M76.52 PATELLAR TENDINITIS OF LEFT KNEE: ICD-10-CM

## 2021-11-22 PROCEDURE — 99213 OFFICE O/P EST LOW 20 MIN: CPT | Performed by: FAMILY MEDICINE

## 2021-11-22 NOTE — PATIENT INSTRUCTIONS
Patient Education        Patellar Tendinitis (Jumper's Knee): Exercises  Introduction  Here are some examples of exercises for you to try. The exercises may be suggested for a condition or for rehabilitation. Start each exercise slowly. Ease off the exercises if you start to have pain. You will be told when to start these exercises and which ones will work best for you. How to do the exercises  Straight-leg raises to the front    1. Lie on your back with your good knee bent so that your foot rests flat on the floor. Your affected leg should be straight. Make sure that your low back has a normal curve. You should be able to slip your hand in between the floor and the small of your back, with your palm touching the floor and your back touching the back of your hand. 2. Tighten the thigh muscles in your affected leg by pressing the back of your knee flat down to the floor. Hold your knee straight. 3. Keeping the thigh muscles tight and your leg straight, lift your leg up so that your heel is about 12 inches off the floor. 4. Hold for about 6 seconds, then lower your leg slowly. Rest for up to 10 seconds between repetitions. 5. Repeat 8 to 12 times. Short-arc quad    1. Lie on your back with your knees bent over a foam roll or large rolled-up towel and your heels on the floor. 2. Lift the lower part of your affected leg until your leg is straight. Keep the back of your knee on the foam roll or towel. 3. Hold your leg straight for about 6 seconds, then slowly bend your knee and lower your heel back to the floor. Rest for up to 10 seconds between repetitions. 4. Repeat 8 to 12 times. Half-squat with knees and feet turned out to the side    1. Stand with your feet about shoulder-width apart and turned out to the side about 45 degrees. 2. Keep your back straight, and tighten your buttocks. 3. Slowly bend your knees to lower your body about one-quarter of the way down toward the floor.  Try to keep your back straight at all times, and do not let your pelvis tilt forward or your knees extend beyond the tip of your toes. 4. Repeat 8 to 12 times. Step up    1. Place a single-step footstool on the floor. If you do not have a footstool, you can use a thick book, such as a phone book, a dictionary, or an encyclopedia. If you are not steady on your feet, hold on to a chair, counter, or wall while you do this exercise. 2. Keeping your back straight, step up with your affected leg. Try not to push off your back leg as you step up. Only use your affected leg to bring you up on to the step. Then lift your other leg on to the step. As you step up, try to keep your knee moving in a straight line with your middle toe. 3. Move back to the starting position, with both feet on the floor. 4. Repeat 8 to 12 times. Step down    1. Stand on a single-step footstool. If you do not have a footstool, you can use a thick book, such as a phone book, a dictionary, or an encyclopedia. If you are not steady on your feet, hold on to a chair, counter, or wall while you do this exercise. 2. Slowly step down with your good leg, allowing your heel to lightly touch the floor. As you step down, try to keep your affected knee moving in a straight line toward your middle toe. 3. Move back to the starting position, with both feet on the footstool or book. 4. Repeat 8 to 12 times. Terminal knee extension    1. Tie the ends of an exercise band together to form a loop. Attach one end of the loop to a secure object, or shut a door on it to hold it in place. (Or you can have someone hold one end of the loop to provide resistance.)  2. Loop the other end of the exercise band around the knee of your affected leg. Keep that leg somewhat bent at the knee. 3. Put your good leg about a step behind your affected leg. Then slowly straighten your affected leg by tightening the thigh muscles of that leg.   4. Hold for about 6 seconds, then return to the starting position, with your knee somewhat bent. 5. Rest for up to 10 seconds. 6. Repeat 8 to 12 times. Follow-up care is a key part of your treatment and safety. Be sure to make and go to all appointments, and call your doctor if you are having problems. It's also a good idea to know your test results and keep a list of the medicines you take. Where can you learn more? Go to https://chpepicewwillard.[email or web address] org and sign in to your Surikate account. Enter F024 in the [employer] box to learn more about \"Patellar Tendinitis (Jumper's Knee): Exercises. \"     If you do not have an account, please click on the \"Sign Up Now\" link. Current as of: July 1, 2021               Content Version: 13.0  © 2006-2021 Healthwise, Incorporated. Care instructions adapted under license by [facility] ([facility]). If you have questions about a medical condition or this instruction, always ask your healthcare professional. [place] [address] any warranty or liability for your use of this information. Patient Education        Quadricep Muscle Strain: Rehab Exercises  Introduction  Here are some examples of exercises for you to try. The exercises may be suggested for a condition or for rehabilitation. Start each exercise slowly. Ease off the exercises if you start to have pain. You will be told when to start these exercises and which ones will work best for you. How to do the exercises  Standing quadriceps stretch    1. If you are not steady on your feet, hold on to a chair, counter, or wall. 2. Bend the knee of the leg you want to stretch, and reach behind you to grab the front of your foot or ankle with the hand on the same side. For example, if you are stretching your right leg, use your right hand. 3. Keeping your knees next to each other, pull your foot toward your buttock until you feel a gentle stretch across the front of your hip and down the front of your thigh.  Your knee should be pointed directly to the ground, and not out to the side. 4. Hold the stretch for at least 15 to 30 seconds. 5. Repeat 2 to 4 times. Quadricep and hip flexor stretch (lying on side)    1. Lie on your side with your good leg flat on the floor and your hand supporting your head. 2. Bend your top leg, and reach behind you to grab the front of that foot or ankle with your other hand. 3. Stretch your leg back by pulling your foot toward your buttock. You will feel the stretch in the front of your thigh. If this causes stress on your knee, do not do this stretch. 4. Hold the stretch for at least 15 to 30 seconds. 5. Repeat 2 to 4 times. Hamstring stretch (lying down)    1. Lie flat on your back with your legs straight. If you feel discomfort in your back, place a small towel roll under your lower back. 2. Holding the back of your affected leg for support, lift your leg straight up and toward your body until you feel a stretch at the back of your thigh. 3. Hold the stretch for at least 30 seconds. 4. Repeat 2 to 4 times. Follow-up care is a key part of your treatment and safety. Be sure to make and go to all appointments, and call your doctor if you are having problems. It's also a good idea to know your test results and keep a list of the medicines you take. Where can you learn more? Go to https://FRUCTpepiceweb.[email or web address] org and sign in to your AirClic account. Enter L898 in the [facility] box to learn more about \"Quadricep Muscle Strain: Rehab Exercises. \"     If you do not have an account, please click on the \"Sign Up Now\" link. Current as of: July 1, 2021               Content Version: 13.0  © 5294-8171 Healthwise, Incorporated. Care instructions adapted under license by [facility] ([facility]). If you have questions about a medical condition or this instruction, always ask your healthcare professional. [address] any warranty or liability for your use of this information.          Patient Education        Quadricep Muscle Strain: Rehab Exercises  Introduction  Here are some examples of exercises for you to try. The exercises may be suggested for a condition or for rehabilitation. Start each exercise slowly. Ease off the exercises if you start to have pain. You will be told when to start these exercises and which ones will work best for you. How to do the exercises  Standing quadriceps stretch    6. If you are not steady on your feet, hold on to a chair, counter, or wall. 7. Bend the knee of the leg you want to stretch, and reach behind you to grab the front of your foot or ankle with the hand on the same side. For example, if you are stretching your right leg, use your right hand. 8. Keeping your knees next to each other, pull your foot toward your buttock until you feel a gentle stretch across the front of your hip and down the front of your thigh. Your knee should be pointed directly to the ground, and not out to the side. 9. Hold the stretch for at least 15 to 30 seconds. 10. Repeat 2 to 4 times. Quadricep and hip flexor stretch (lying on side)    6. Lie on your side with your good leg flat on the floor and your hand supporting your head. 7. Bend your top leg, and reach behind you to grab the front of that foot or ankle with your other hand. 8. Stretch your leg back by pulling your foot toward your buttock. You will feel the stretch in the front of your thigh. If this causes stress on your knee, do not do this stretch. 9. Hold the stretch for at least 15 to 30 seconds. 10. Repeat 2 to 4 times. Hamstring stretch (lying down)    5. Lie flat on your back with your legs straight. If you feel discomfort in your back, place a small towel roll under your lower back. 6. Holding the back of your affected leg for support, lift your leg straight up and toward your body until you feel a stretch at the back of your thigh. 7. Hold the stretch for at least 30 seconds.   8. Repeat 2 to 4 times.  Follow-up care is a key part of your treatment and safety. Be sure to make and go to all appointments, and call your doctor if you are having problems. It's also a good idea to know your test results and keep a list of the medicines you take. Where can you learn more? Go to https://chpepiceweb.[email or web address]. org and sign in to your Breezeplay account. Enter M279 in the [employer] box to learn more about \"Quadricep Muscle Strain: Rehab Exercises. \"     If you do not have an account, please click on the \"Sign Up Now\" link. Current as of: July 1, 2021               Content Version: 13.0  © 2006-2021 Healthwise, Incorporated. Care instructions adapted under license by [facility] ([facility]). If you have questions about a medical condition or this instruction, always ask your healthcare professional. [address] any warranty or liability for your use of this information.

## 2021-11-22 NOTE — PROGRESS NOTES
Saba Gutiérrez, a female of 39 y.o. came to the office 11/22/2021. Patient Active Problem List   Diagnosis    Gastroesophageal reflux disease without esophagitis          Ankle Pain   The incident occurred more than 1 week ago. The injury mechanism was a twisting injury. The pain is present in the right ankle. The quality of the pain is described as aching. The pain has been improving since onset. Pertinent negatives include no inability to bear weight, loss of motion or muscle weakness. Treatments tried: wearing a brace. The treatment provided moderate relief. Knee Pain   There was no injury mechanism. The pain has been intermittent (over 18 months. ) since onset. Pertinent negatives include no inability to bear weight, loss of motion or muscle weakness. Associated symptoms comments: - swelling. . Exacerbated by: going down stairs. She has tried NSAIDs for the symptoms. The treatment provided mild relief. ANKLE PAIN: sprained ankle 1 wk ago. - xray for fracture. Getting better. Allergies   Allergen Reactions    Adhesive Tape Rash       Current Outpatient Medications on File Prior to Visit   Medication Sig Dispense Refill    omeprazole (PRILOSEC) 20 MG delayed release capsule TAKE 1 CAPSULE BY MOUTH EVERY DAY 30 capsule 3    loratadine (CLARITIN) 10 MG tablet Take 10 mg by mouth daily       No current facility-administered medications on file prior to visit. Review of Systems   Musculoskeletal: Negative for gait problem. other review of systems reviewed and are negative    OBJECTIVE:  /82   Pulse 72   Temp 98 °F (36.7 °C)   Wt 189 lb (85.7 kg)   SpO2 98%   BMI 34.57 kg/m²      Physical Exam  Constitutional:       General: She is not in acute distress. Appearance: Normal appearance. She is not ill-appearing, toxic-appearing or diaphoretic. HENT:      Head: Normocephalic. Eyes:      General: No scleral icterus.   Pulmonary:      Effort: Pulmonary effort is normal. Musculoskeletal:      Left knee: Bony tenderness (medially) present. No swelling or deformity. Normal range of motion. No LCL laxity or MCL laxity. Normal patellar mobility. Instability Tests: Anterior drawer test negative. Medial Angelo test negative and lateral Angelo test negative. Right ankle: No swelling or deformity. Tenderness present over the lateral malleolus, medial malleolus, ATF ligament and AITF ligament. Normal range of motion. Right Achilles Tendon: Normal.   Neurological:      Mental Status: She is alert and oriented to person, place, and time. Psychiatric:         Mood and Affect: Mood normal.         ASSESSMENT AND PLAN:    Angus Luna was seen today for follow-up, ankle pain and knee pain. Diagnoses and all orders for this visit:    Sprain of tibiofibular ligament of right ankle, subsequent encounter    Patellar tendinitis of left knee    - continue ankle brace right knee prn and exercises  - HO patellar and quadriceps exercises do daily  - aleve or motrin. Return if symptoms worsen or fail to improve.     Hasmukh Biggs, DO

## 2022-01-16 DIAGNOSIS — K21.9 GASTROESOPHAGEAL REFLUX DISEASE WITHOUT ESOPHAGITIS: ICD-10-CM

## 2022-01-18 RX ORDER — OMEPRAZOLE 20 MG/1
CAPSULE, DELAYED RELEASE ORAL
Qty: 90 CAPSULE | Refills: 1 | Status: SHIPPED
Start: 2022-01-18 | End: 2022-07-25

## 2022-04-26 ENCOUNTER — TELEPHONE (OUTPATIENT)
Dept: PRIMARY CARE CLINIC | Age: 42
End: 2022-04-26

## 2022-04-26 NOTE — TELEPHONE ENCOUNTER
----- Message from Niall Conway sent at 4/26/2022  8:28 AM EDT -----  Subject: Appointment Request    Reason for Call: Semi-Routine Cough, Cold Symptoms    QUESTIONS  Type of Appointment? Established Patient  Reason for appointment request? No appointments available during search  Additional Information for Provider? Starting yesterday with severe   congestion, cough and bilateral ear pain l greater than r would like to   get in [facility] but no appointments populated Please call and advise patient.  ---------------------------------------------------------------------------  --------------  [address]  What is the best way for the office to contact you? OK to leave message on   voicemail  Preferred Call Back Phone Number? [phone]  ---------------------------------------------------------------------------  --------------  SCRIPT ANSWERS  Relationship to Patient? Self  Are you currently unable to finish sentences due to any difficulty   breathing? No  Are you unable to swallow liquids? No  Are you having fevers (100.4 or greater), chills, or sweats? No  Do you have COPD, asthma or a chronic lung condition? No  Have your symptoms been present for more than 5 days? No  Have you recently (14 days) been seen by a provider for this issue? No  Have you been diagnosed with, awaiting test results for, or told that you   are suspected of having COVID-19 (Coronavirus)? (If patient has tested   negative or was tested as a requirement for work, school, or travel and   not based on symptoms, answer no)? No  Within the past 10 days have you developed any of the following symptoms   (answer no if symptoms have been present longer than 10 days or began   more than 10 days ago)?  Fever or Chills, Cough, Shortness of breath or   difficulty breathing, Loss of taste or smell, Sore throat, Nasal   congestion, Sneezing or runny nose, Fatigue or generalized body aches   (answer no if pain is specific to a body part e.g. back pain), Diarrhea,   Headache?  Yes

## 2022-07-25 DIAGNOSIS — K21.9 GASTROESOPHAGEAL REFLUX DISEASE WITHOUT ESOPHAGITIS: ICD-10-CM

## 2022-07-25 RX ORDER — OMEPRAZOLE 20 MG/1
CAPSULE, DELAYED RELEASE ORAL
Qty: 90 CAPSULE | Refills: 0 | Status: SHIPPED
Start: 2022-07-25 | End: 2022-10-31

## 2022-10-29 DIAGNOSIS — K21.9 GASTROESOPHAGEAL REFLUX DISEASE WITHOUT ESOPHAGITIS: ICD-10-CM

## 2022-10-31 RX ORDER — OMEPRAZOLE 20 MG/1
CAPSULE, DELAYED RELEASE ORAL
Qty: 90 CAPSULE | Refills: 0 | Status: SHIPPED | OUTPATIENT
Start: 2022-10-31

## 2023-02-01 DIAGNOSIS — K21.9 GASTROESOPHAGEAL REFLUX DISEASE WITHOUT ESOPHAGITIS: ICD-10-CM

## 2023-02-01 RX ORDER — OMEPRAZOLE 20 MG/1
CAPSULE, DELAYED RELEASE ORAL
Qty: 30 CAPSULE | Refills: 2 | Status: SHIPPED | OUTPATIENT
Start: 2023-02-01

## 2023-03-03 LAB
ALBUMIN SERPL-MCNC: 4.1 G/DL
ALP BLD-CCNC: 78 U/L
ALT SERPL-CCNC: 22 U/L
ANION GAP SERPL CALCULATED.3IONS-SCNC: NORMAL MMOL/L
AST SERPL-CCNC: 16 U/L
AVERAGE GLUCOSE: NORMAL
BASOPHILS ABSOLUTE: 0.1 /ΜL
BASOPHILS RELATIVE PERCENT: 1 %
BILIRUB SERPL-MCNC: 0.3 MG/DL (ref 0.1–1.4)
BUN BLDV-MCNC: 22 MG/DL
CALCIUM SERPL-MCNC: 9.1 MG/DL
CHLORIDE BLD-SCNC: 105 MMOL/L
CO2: 21 MMOL/L
CREAT SERPL-MCNC: 1.07 MG/DL
EGFR: 67
EOSINOPHILS ABSOLUTE: 0.3 /ΜL
EOSINOPHILS RELATIVE PERCENT: 4 %
GLUCOSE BLD-MCNC: 91 MG/DL
HBA1C MFR BLD: 5.5 %
HCT VFR BLD CALC: 42.7 % (ref 36–46)
HEMOGLOBIN: 13.8 G/DL (ref 12–16)
LYMPHOCYTES ABSOLUTE: 2 /ΜL
LYMPHOCYTES RELATIVE PERCENT: 28 %
MCH RBC QN AUTO: 28.4 PG
MCHC RBC AUTO-ENTMCNC: 32.3 G/DL
MCV RBC AUTO: 88 FL
MONOCYTES ABSOLUTE: 0.7 /ΜL
MONOCYTES RELATIVE PERCENT: 10 %
NEUTROPHILS ABSOLUTE: 3.9 /ΜL
NEUTROPHILS RELATIVE PERCENT: 57 %
PDW BLD-RTO: 12.6 %
PLATELET # BLD: 250 K/ΜL
PMV BLD AUTO: NORMAL FL
POTASSIUM SERPL-SCNC: 4.7 MMOL/L
RBC # BLD: 4.86 10^6/ΜL
SODIUM BLD-SCNC: 141 MMOL/L
TOTAL PROTEIN: 6.8
TSH SERPL DL<=0.05 MIU/L-ACNC: 2.49 UIU/ML
WBC # BLD: 6.9 10^3/ML

## 2023-05-29 DIAGNOSIS — K21.9 GASTROESOPHAGEAL REFLUX DISEASE WITHOUT ESOPHAGITIS: ICD-10-CM

## 2023-05-30 RX ORDER — OMEPRAZOLE 20 MG/1
CAPSULE, DELAYED RELEASE ORAL
Qty: 90 CAPSULE | Refills: 0 | Status: SHIPPED | OUTPATIENT
Start: 2023-05-30

## 2023-08-16 DIAGNOSIS — K21.9 GASTROESOPHAGEAL REFLUX DISEASE WITHOUT ESOPHAGITIS: ICD-10-CM

## 2023-08-16 RX ORDER — OMEPRAZOLE 20 MG/1
CAPSULE, DELAYED RELEASE ORAL
Qty: 90 CAPSULE | Refills: 0 | Status: SHIPPED | OUTPATIENT
Start: 2023-08-16

## 2023-12-07 DIAGNOSIS — K21.9 GASTROESOPHAGEAL REFLUX DISEASE WITHOUT ESOPHAGITIS: ICD-10-CM

## 2023-12-07 RX ORDER — OMEPRAZOLE 20 MG/1
CAPSULE, DELAYED RELEASE ORAL
Qty: 90 CAPSULE | Refills: 0 | OUTPATIENT
Start: 2023-12-07

## 2024-07-19 DIAGNOSIS — K21.9 GASTROESOPHAGEAL REFLUX DISEASE WITHOUT ESOPHAGITIS: ICD-10-CM

## 2024-07-19 RX ORDER — OMEPRAZOLE 20 MG/1
CAPSULE, DELAYED RELEASE ORAL DAILY
Qty: 90 CAPSULE | Refills: 1 | Status: SHIPPED | OUTPATIENT
Start: 2024-07-19

## 2024-10-22 ENCOUNTER — OFFICE VISIT (OUTPATIENT)
Age: 44
End: 2024-10-22
Payer: COMMERCIAL

## 2024-10-22 VITALS
SYSTOLIC BLOOD PRESSURE: 108 MMHG | HEART RATE: 73 BPM | WEIGHT: 149.4 LBS | TEMPERATURE: 98 F | BODY MASS INDEX: 27.49 KG/M2 | DIASTOLIC BLOOD PRESSURE: 76 MMHG | HEIGHT: 62 IN | OXYGEN SATURATION: 95 %

## 2024-10-22 DIAGNOSIS — J01.90 ACUTE BACTERIAL SINUSITIS: Primary | ICD-10-CM

## 2024-10-22 DIAGNOSIS — B96.89 ACUTE BACTERIAL SINUSITIS: Primary | ICD-10-CM

## 2024-10-22 DIAGNOSIS — R05.1 ACUTE COUGH: ICD-10-CM

## 2024-10-22 PROCEDURE — 99213 OFFICE O/P EST LOW 20 MIN: CPT

## 2024-10-22 RX ORDER — CEFDINIR 300 MG/1
300 CAPSULE ORAL 2 TIMES DAILY
Qty: 20 CAPSULE | Refills: 0 | Status: SHIPPED | OUTPATIENT
Start: 2024-10-22 | End: 2024-11-01

## 2024-10-22 NOTE — PROGRESS NOTES
Chief Complaint       Cough (Sunday, thick yellow mucous- using sudafed, flonase, advil) and Pharyngitis (friday)      History of Present Illness   Source of history provided by:  patient.      Marlee Jenkins is a 44 y.o. old female presenting to the walk in clinic for evaluation of sinus pressure, nasal congestion, discolored nasal drainage, bilateral ear pressure, mild productive cough and sore throat x 5 days. Has been taking Sudafed, Flonase and Claritin OTC without relief. Denies any fever, chills, wheezing, CP, SOB, or GI symptoms. Denies any hx of asthma, COPD, or tobacco use.     ROS    Unless otherwise stated in this report or unable to obtain because of the patient's clinical or mental status as evidenced by the medical record, this patients's positive and negative responses for Review of Systems, constitutional, psych, eyes, ENT, cardiovascular, respiratory, gastrointestinal, neurological, genitourinary, musculoskeletal, integument systems and systems related to the presenting problem are either stated in the preceding or were not pertinent or were negative for the symptoms and/or complaints related to the medical problem.    Physical Exam         VS:  /76   Pulse 73   Temp 98 °F (36.7 °C)   Ht 1.575 m (5' 2\")   Wt 67.8 kg (149 lb 6.4 oz)   SpO2 95%   BMI 27.33 kg/m²    Oxygen Saturation Interpretation: Normal.    Physical Exam  Vitals reviewed.   Constitutional:       Appearance: Normal appearance. She is normal weight.   HENT:      Head: Normocephalic and atraumatic.      Right Ear: Tympanic membrane, ear canal and external ear normal.      Left Ear: Tympanic membrane, ear canal and external ear normal.      Nose: Congestion and rhinorrhea present.      Right Turbinates: Swollen.      Left Turbinates: Swollen.      Right Sinus: Maxillary sinus tenderness present.      Left Sinus: Maxillary sinus tenderness present.      Mouth/Throat:      Mouth: Mucous membranes are moist.      Pharynx:

## 2025-01-06 ENCOUNTER — OFFICE VISIT (OUTPATIENT)
Age: 45
End: 2025-01-06
Payer: COMMERCIAL

## 2025-01-06 VITALS
WEIGHT: 162.2 LBS | HEART RATE: 64 BPM | BODY MASS INDEX: 29.85 KG/M2 | DIASTOLIC BLOOD PRESSURE: 88 MMHG | HEIGHT: 62 IN | SYSTOLIC BLOOD PRESSURE: 126 MMHG | TEMPERATURE: 97.4 F | RESPIRATION RATE: 16 BRPM | OXYGEN SATURATION: 93 %

## 2025-01-06 DIAGNOSIS — Z20.822 CLOSE EXPOSURE TO COVID-19 VIRUS: ICD-10-CM

## 2025-01-06 DIAGNOSIS — B34.9 VIRAL ILLNESS: Primary | ICD-10-CM

## 2025-01-06 DIAGNOSIS — Z20.828 EXPOSURE TO THE FLU: ICD-10-CM

## 2025-01-06 LAB
INFLUENZA A ANTIGEN, POC: NEGATIVE
INFLUENZA B ANTIGEN, POC: NEGATIVE
LOT EXPIRE DATE: NORMAL
LOT KIT NUMBER: NORMAL
SARS-COV-2, POC: NORMAL
VALID INTERNAL CONTROL: NORMAL
VENDOR AND KIT NAME POC: NORMAL

## 2025-01-06 PROCEDURE — 87428 SARSCOV & INF VIR A&B AG IA: CPT

## 2025-01-06 PROCEDURE — 99213 OFFICE O/P EST LOW 20 MIN: CPT

## 2025-01-06 NOTE — PROGRESS NOTES
without wheezes, rales, or rhonchi  Heart:  Regular rate and rhythm, normal heart sounds, without pathological murmurs, ectopy, gallops, or rubs.  Skin:  Normal turgor.  Warm, dry, without visible rash.  Neurological:  Alert and oriented.  Motor functions intact.  Responds to verbal commands.     Lab / Imaging Results   (All laboratory and radiology results have been personally reviewed by myself)  Labs:  Results for orders placed or performed in visit on 25   POCT COVID-19 & Influenza A/B   Result Value Ref Range    SARS-COV-2, POC Not-Detected Not Detected    Influenza A Antigen, POC Negative Not Detected    Influenza B Antigen, POC Negative Not Detected    Vendor and kit name Veritor     Internal Control pass     Lot/Kit Number [id number]     Lot/Kit  date: 2025        Imaging:  All Radiology results interpreted by Radiologist unless otherwise noted.      Assessment / Plan     Impression(s):  Marlee was seen today for pharyngitis and facial pain.    Diagnoses and all orders for this visit:    Viral illness  -     POCT COVID-19 & Influenza A/B    Exposure to the flu  -     POCT COVID-19 & Influenza A/B    Close exposure to COVID-19 virus  -     POCT COVID-19 & Influenza A/B      Disposition:  Disposition: Discharge to home.    Vital signs, past medical history, medications, and allergies reviewed at visit.    Pt advised that illness is likely viral and should resolve with time and conservative measures.   Continue over-the-counter Mucinex for symptomatic relief, Flonase and Zyrtec.  Increase fluids and rest. Additional symptomatic relief discussed.    Follow up PCP no improvement after 7-10 days of symptoms. ED sooner if symptoms worsen or change. Red flag symptoms discussed. Pt is in agreement with this care plan. All questions answered.    Tamara Allen, APRN - CNP    **This report was transcribed using voice recognition software. Every effort was made to ensure accuracy; however, inadvertent

## 2025-01-08 ENCOUNTER — OFFICE VISIT (OUTPATIENT)
Age: 45
End: 2025-01-08
Payer: COMMERCIAL

## 2025-01-08 VITALS
HEIGHT: 62 IN | SYSTOLIC BLOOD PRESSURE: 114 MMHG | TEMPERATURE: 97.4 F | RESPIRATION RATE: 16 BRPM | HEART RATE: 102 BPM | WEIGHT: 162 LBS | OXYGEN SATURATION: 94 % | BODY MASS INDEX: 29.81 KG/M2 | DIASTOLIC BLOOD PRESSURE: 72 MMHG

## 2025-01-08 DIAGNOSIS — R05.1 ACUTE COUGH: ICD-10-CM

## 2025-01-08 DIAGNOSIS — U07.1 COVID: Primary | ICD-10-CM

## 2025-01-08 LAB
Lab: ABNORMAL
PERFORMING INSTRUMENT: ABNORMAL
QC PASS/FAIL: ABNORMAL
SARS-COV-2, POC: DETECTED

## 2025-01-08 PROCEDURE — 87426 SARSCOV CORONAVIRUS AG IA: CPT

## 2025-01-08 PROCEDURE — 99213 OFFICE O/P EST LOW 20 MIN: CPT

## 2025-01-08 NOTE — PROGRESS NOTES
Chief Complaint       Congestion, Cough, and Headache      History of Present Illness   Source of history provided by:  patient.    Marlee Jenkins is a 44 y.o. old female presenting to the walk in clinic for evaluation of congestion, cough and headache for 4 days. Denies any fever, CP, dyspnea, LE edema, abdominal pain, vomiting, rash, or lethargy. Patient has been taking Flonase, Zyrtec and Mucinex OTC without symptomatic relief. Denies any hx of asthma or COPD. Patient reports recent exposures to Covid. Patient has not been vaccinated for COVID-19. Patient has had COVID-19 infection in the past.    ROS    Unless otherwise stated in this report or unable to obtain because of the patient's clinical or mental status as evidenced by the medical record, this patients's positive and negative responses for Review of Systems, constitutional, psych, eyes, ENT, cardiovascular, respiratory, gastrointestinal, neurological, genitourinary, musculoskeletal, integument systems and systems related to the presenting problem are either stated in the preceding or were not pertinent or were negative for the symptoms and/or complaints related to the medical problem.      Physical Exam         VS:  /72   Pulse (!) 102   Temp 97.4 °F (36.3 °C)   Resp 16   Ht 1.575 m (5' 2\")   Wt 73.5 kg (162 lb)   SpO2 94%   BMI 29.63 kg/m²    Oxygen Saturation Interpretation: Normal.    Constitutional:  Alert, development consistent with age. NAD.  Head:  NC/NT. Airway patent.   Mouth: Posterior pharynx with mild erythema and clear postnasal drip. No tonsillar hypertrophy or exudate.   Neck:  Normal ROM. Supple. No anterior cervical adenopathy noted.  Lungs: CTAB without wheezes, rales, or rhonchi.   CV:  Regular rate and rhythm, normal heart sounds, without pathological murmurs, ectopy, gallops, or rubs.  Skin:  Normal turgor.  Warm, dry, without visible rash.  Lymphatic: No lymphangitis or adenopathy noted.  Neurological:  Oriented.

## 2025-01-21 ENCOUNTER — OFFICE VISIT (OUTPATIENT)
Age: 45
End: 2025-01-21

## 2025-01-21 VITALS
RESPIRATION RATE: 16 BRPM | HEART RATE: 65 BPM | OXYGEN SATURATION: 91 % | DIASTOLIC BLOOD PRESSURE: 78 MMHG | BODY MASS INDEX: 29.81 KG/M2 | HEIGHT: 62 IN | WEIGHT: 162 LBS | SYSTOLIC BLOOD PRESSURE: 104 MMHG | TEMPERATURE: 97 F

## 2025-01-21 DIAGNOSIS — R51.9 NONINTRACTABLE HEADACHE, UNSPECIFIED CHRONICITY PATTERN, UNSPECIFIED HEADACHE TYPE: ICD-10-CM

## 2025-01-21 DIAGNOSIS — J02.9 SORE THROAT: ICD-10-CM

## 2025-01-21 DIAGNOSIS — J01.40 ACUTE NON-RECURRENT PANSINUSITIS: Primary | ICD-10-CM

## 2025-01-21 LAB
INFLUENZA A ANTIBODY: NORMAL
INFLUENZA B ANTIBODY: NORMAL

## 2025-01-21 RX ORDER — CEFDINIR 300 MG/1
300 CAPSULE ORAL 2 TIMES DAILY
Qty: 20 CAPSULE | Refills: 0 | Status: SHIPPED | OUTPATIENT
Start: 2025-01-21 | End: 2025-01-31

## 2025-01-21 NOTE — PROGRESS NOTES
Chief Complaint       Post-COVID Symptoms (X 2 wks ago), Pharyngitis, Congestion (Nasal), and Headache (Facial pressure)      History of Present Illness   Source of history provided by:  patient.      Marlee Jenkins is a 44 y.o. old female presenting to the walk in clinic for evaluation of sinus pressure, nasal congestion, discolored nasal drainage (green and yellow), bilateral ear pressure, mild nonproductive cough and sore throat x 2 weeks. Has been taking Tylenol OTC without relief. Denies any fever, chills, wheezing, CP, SOB, or GI symptoms. Denies any hx of asthma, COPD, or tobacco use. Patient had Covid 2 weeks ago and states she felt like symptoms were improving until a few days ago and drainage has changed colors.    ROS    Unless otherwise stated in this report or unable to obtain because of the patient's clinical or mental status as evidenced by the medical record, this patients's positive and negative responses for Review of Systems, constitutional, psych, eyes, ENT, cardiovascular, respiratory, gastrointestinal, neurological, genitourinary, musculoskeletal, integument systems and systems related to the presenting problem are either stated in the preceding or were not pertinent or were negative for the symptoms and/or complaints related to the medical problem.    Physical Exam         VS:  /78   Pulse 65   Temp 97 °F (36.1 °C)   Resp 16   Ht 1.575 m (5' 2\")   Wt 73.5 kg (162 lb)   SpO2 91%   BMI 29.63 kg/m²    Oxygen Saturation Interpretation: Normal.    Physical Exam  Vitals reviewed.   Constitutional:       Appearance: Normal appearance. She is normal weight.   HENT:      Head: Normocephalic and atraumatic.      Right Ear: Ear canal and external ear normal. A middle ear effusion is present.      Left Ear: Ear canal and external ear normal. A middle ear effusion is present.      Nose:      Right Turbinates: Swollen.      Left Turbinates: Swollen.      Right Sinus: Maxillary sinus

## 2025-05-04 DIAGNOSIS — K21.9 GASTROESOPHAGEAL REFLUX DISEASE WITHOUT ESOPHAGITIS: ICD-10-CM

## 2025-05-05 RX ORDER — OMEPRAZOLE 20 MG/1
CAPSULE, DELAYED RELEASE ORAL DAILY
Qty: 90 CAPSULE | Refills: 1 | Status: SHIPPED | OUTPATIENT
Start: 2025-05-05